# Patient Record
Sex: MALE | Race: WHITE
[De-identification: names, ages, dates, MRNs, and addresses within clinical notes are randomized per-mention and may not be internally consistent; named-entity substitution may affect disease eponyms.]

---

## 2018-05-08 ENCOUNTER — HOSPITAL ENCOUNTER (EMERGENCY)
Dept: HOSPITAL 25 - UCEAST | Age: 38
Discharge: HOME | End: 2018-05-08
Payer: COMMERCIAL

## 2018-05-08 VITALS — DIASTOLIC BLOOD PRESSURE: 68 MMHG | SYSTOLIC BLOOD PRESSURE: 110 MMHG
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
## 2018-05-08 DIAGNOSIS — M25.561: ICD-10-CM
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
## 2018-05-08 DIAGNOSIS — L03.115: ICD-10-CM

## 2018-05-08 DIAGNOSIS — S91.331A: Primary | ICD-10-CM
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
## 2018-05-08 PROCEDURE — G0463 HOSPITAL OUTPT CLINIC VISIT: HCPCS

## 2018-05-08 PROCEDURE — 99212 OFFICE O/P EST SF 10 MIN: CPT

## 2018-05-08 NOTE — RAD
Indication: Mild edema; stepped on a nail 2 days ago



Comparison: August 30, 2012 RIGHT ankle radiographs.



Technique: AP, lateral, and oblique views RIGHT foot. 



Report: Metallic marker along the plantar surface of the foot at level of the base of the

first metatarsal indicating the site of clinical concern. Surrounding soft tissue edema

and potential minimal subcutaneous emphysema. No conspicuous foreign body evident.

Negative for fracture or malalignment. Os trigonum accessory ossicle noted. Moderate

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



IMPRESSION: Soft tissue edema and probable mild subcutaneous emphysema at the medial

plantar aspect of the forefoot. No conspicuous retained foreign body evident.

## 2018-05-08 NOTE — UC
Katarina YANES Gabriel, scribed for Zakiya Stubbs MD on 05/08/18 at 1549 .





Lower Extremity/Ankle HPI





- HPI Summary


HPI Summary: 





This patient is a 38 year old M presenting to [facility] [place] c/o right foot pain after 

he stepped on nail two days ago. The patient did not have shoes on and the nail 

was galvanized so it was not alejandra. He has cleaned it, covered it with 

antibacterial cream, and a bandage. The patient rates the pain 3/10 in 

severity. Patient reports pain worse today than yesterday. mild erythema, no 

drainage.  No odor. Pt states today mild right knee pain but maybe related to 

gait.  Patient denies fever, chills, n/v, and foreign body. Pt states he last 

tetanus shot was 4 years ago. He is not immunocompromised. Patients medication 

reviewed this visit.





- History of Current Complaint


Chief Complaint: UCLowerExtremity


Stated Complaint: STEPPED ON NAIL


Hx Obtained From: Patient


Onset/Duration: Lasting Days - 2, Still Present


Severity Initially: Mild


Severity Currently: Mild


Pain Intensity: 3


Pain Scale Used: 0-10 Numeric


Aggravating Factor(s): Ambulation


Able to Bear Weight: Yes





- Allergies/Home Medications


Allergies/Adverse Reactions: 


 Allergies











Allergy/AdvReac Type Severity Reaction Status Date / Time


 


BEE STINGS Allergy  HIVES, Uncoded 05/08/18 15:37





   SWELLINGS  


 


GLUTEN Allergy  UNSTABLE Uncoded 05/08/18 15:37





   MOODS,  





   ALWAYS  





   HUNGRY,  





   CRAMPS  











Home Medications: 


 Home Medications





Ibuprofen TAB* [Motrin TAB* 400 MG] 400 mg PO Q6HR PRN 05/08/18 [History 

Confirmed 05/08/18]


buPROPion TAB* [Wellbutrin TAB*] 300 mg PO DAILY 05/08/18 [History Confirmed 05/ 08/18]











PMH/Surg Hx/FS Hx/Imm Hx


Previously Healthy: Yes


Other History Of: 


   Negative For: Hepatitis C





- Surgical History


Surgical History: Yes


Surgery Procedure, Year, and Place: 1994 PREVIOUS ANKLE SURGERY NO METAL, PA.  

1984 PINNING RIGHT FEMUR FRACTURE, PA.  RIGHT HAND POINTER FINGER TENDON 

REPAIRED 2014





- Family History


Known Family History: Positive: Diabetes





- Social History


Occupation: Employed Full-time


Lives: With Family


Alcohol Use: None


Substance Use Type: None


Smoking Status (MU): Never Smoked Tobacco





- Immunization History


Most Recent Tetanus Shot: 2014





Review of Systems


Constitutional: Negative


Skin: Other - foot swelling, and erythema


Musculoskeletal: Other: - right foot and knee pain


Is Patient Immunocompromised?: No


All Other Systems Reviewed And Are Negative: Yes





Physical Exam


Triage Information Reviewed: Yes


Vital Signs: 


 Initial Vital Signs











Temp  98.9 F   05/08/18 15:30


 


Pulse  78   05/08/18 15:30


 


Resp  18   05/08/18 15:30


 


BP  110/68   05/08/18 15:30


 


Pulse Ox  99   05/08/18 15:30











Vital Signs Reviewed: Yes


Eyes: Positive: Conjunctiva Clear


ENT: Positive: Hearing grossly normal


Neck: Positive: Supple


Cardiovascular: Positive: Other: - 2+ DP, PT CBT < 2 sec all digits


Musculoskeletal: Positive: Other: - + flex/ext knee + flex/ext ankle + great 

toe ext/flexion No pain dorsum foot Pt with mild discomfort in area of puncture 

on sole


Neurological Exam: Normal


Neurological: Positive: Alert, Muscle Tone Normal


Psychological Exam: Normal


Skin: Positive: Other - pt with 3mm puncture/abraison sole of right foot medial 

aspect of midfoot - mild erythema. no warmth, crepitus, fluctuance, edema, 

ecchymosis, no red streaking. No drainage, odor with palpation of wound





Diagnostics





- Radiology


  ** Foot Xray


Radiology Interpretation Completed By: Radiologist - Soft tissue edema and 

probable mild subcutaneous emphysema at the medial plantar aspect of the 

forefoot. No conspicuous retained foreign body evident. Dr. Stubbs has 

reviewed this report.





Re-Evaluation





- Re-Evaluation


  ** First Eval


Re-Evaluation Time: 16:32


Change: Unchanged


Comment: reviewed imaging with pt.  in agreement with plan for ortho f/u - will 

call 1-2 days.  Clinda.  crutches.  wound care.  pt given bucket for soaking.  

strict return precautions discussed





Lower Extremity Course/Dx





- Course


Course Of Treatment: pt with puncture wound to right foot - bare feet.  Will 

check imaging.  epsom salt soaks, wound care.  tdap utd per pt.  Clinda - was 

in barefeet -concern for staph.  re-check 36-48 hours. Strict return 

precautions discussed





- Differential Dx/Diagnosis


Provider Diagnoses: puncture wound.  minimal cellulitis





- Physician Notifications


Discussed Patient Care With: Jelani Harrell - agreement with outlined plan, aware 

of minimal subcut air, will see in f/u in office


Time Discussed With Above Provider: 16:28


Instructed by Provider To: Other - We discussed patient care with Dr. Harrell 

and he agrees with the course of treatment. He also agreed to follow up with 

the patient in his office.





Discharge





- Sign-Out/Discharge


Documenting (check all that apply): Discharge/Admit/Transfer





- Discharge Plan


Condition: Stable


Disposition: HOME


Prescriptions: 


Clindamycin Cap(NF) [Clindamycin Cap 300 mg Cap(NF)] 300 mg PO TID #30 cap


Patient Education Materials:  Puncture Wound (ED)


Referrals: 


Jelani Harrell MD [Medical Doctor] -  (Please call Dr. Harrell in 1-2 days.)


Additional Instructions: 


- soak your foot in Epsom salt water bath for 15 minutes, 2-3 times a day. 

After soaking, completely dry your wound, cover with antibiotics ointment and 

bandage (neosporin, polysporin)


- take antibiotics as prescribed until gone


- Okay to alternate ibuprofen (advil, motrin) 600mg and tylenol every 3 hours 

for pain. Take with food. Do NOT take for more than 4-5 days. 


- Use crutches if you are walking with a limp to prevent strain injury to your 

back or other leg


- monitor your wound closely - if your reddness gets worse, pain gets worse, 

fever, drainage, or ANY other concerns it is recommended you go directly to the 

emergency department for further evaluation


- Contact Dr. Harrell, orthopedic provider, to schedule a follow-up appointment 

this week - he is expecting you. Contact Dr. Harrell or go directly to the 

emergency department with any questions or concerns








- Billing Disposition and Condition


Condition: STABLE


Disposition: HOME





The documentation as recorded by the Katarina gu Gabriel accurately reflects 

the service I personally performed and the decisions made by me, Zakiya Stubbs MD.